# Patient Record
Sex: MALE | Race: WHITE | NOT HISPANIC OR LATINO
[De-identification: names, ages, dates, MRNs, and addresses within clinical notes are randomized per-mention and may not be internally consistent; named-entity substitution may affect disease eponyms.]

---

## 2021-03-18 ENCOUNTER — TRANSCRIPTION ENCOUNTER (OUTPATIENT)
Age: 56
End: 2021-03-18

## 2021-04-17 ENCOUNTER — EMERGENCY (EMERGENCY)
Facility: HOSPITAL | Age: 56
LOS: 1 days | Discharge: HOME | End: 2021-04-17
Attending: EMERGENCY MEDICINE
Payer: COMMERCIAL

## 2021-04-17 VITALS
OXYGEN SATURATION: 100 % | TEMPERATURE: 98 F | HEART RATE: 104 BPM | SYSTOLIC BLOOD PRESSURE: 123 MMHG | DIASTOLIC BLOOD PRESSURE: 75 MMHG | RESPIRATION RATE: 18 BRPM

## 2021-04-17 LAB
ALBUMIN SERPL ELPH-MCNC: 4.2 G/DL — SIGNIFICANT CHANGE UP (ref 3.3–5.2)
ALP SERPL-CCNC: 79 U/L — SIGNIFICANT CHANGE UP (ref 40–120)
ALT FLD-CCNC: 18 U/L — SIGNIFICANT CHANGE UP
ANION GAP SERPL CALC-SCNC: 13 MMOL/L — SIGNIFICANT CHANGE UP (ref 5–17)
AST SERPL-CCNC: 23 U/L — SIGNIFICANT CHANGE UP
BASOPHILS # BLD AUTO: 0.02 K/UL — SIGNIFICANT CHANGE UP (ref 0–0.2)
BASOPHILS NFR BLD AUTO: 0.2 % — SIGNIFICANT CHANGE UP (ref 0–2)
BILIRUB SERPL-MCNC: 0.4 MG/DL — SIGNIFICANT CHANGE UP (ref 0.4–2)
BUN SERPL-MCNC: 19 MG/DL — SIGNIFICANT CHANGE UP (ref 8–20)
CALCIUM SERPL-MCNC: 8.9 MG/DL — SIGNIFICANT CHANGE UP (ref 8.6–10.2)
CHLORIDE SERPL-SCNC: 103 MMOL/L — SIGNIFICANT CHANGE UP (ref 98–107)
CO2 SERPL-SCNC: 24 MMOL/L — SIGNIFICANT CHANGE UP (ref 22–29)
CREAT SERPL-MCNC: 0.85 MG/DL — SIGNIFICANT CHANGE UP (ref 0.5–1.3)
EOSINOPHIL # BLD AUTO: 0.1 K/UL — SIGNIFICANT CHANGE UP (ref 0–0.5)
EOSINOPHIL NFR BLD AUTO: 1.2 % — SIGNIFICANT CHANGE UP (ref 0–6)
ETHANOL SERPL-MCNC: <10 MG/DL — SIGNIFICANT CHANGE UP (ref 0–9)
GLUCOSE SERPL-MCNC: 92 MG/DL — SIGNIFICANT CHANGE UP (ref 70–99)
HCT VFR BLD CALC: 47.4 % — SIGNIFICANT CHANGE UP (ref 39–50)
HGB BLD-MCNC: 16 G/DL — SIGNIFICANT CHANGE UP (ref 13–17)
IMM GRANULOCYTES NFR BLD AUTO: 0.2 % — SIGNIFICANT CHANGE UP (ref 0–1.5)
LYMPHOCYTES # BLD AUTO: 1.58 K/UL — SIGNIFICANT CHANGE UP (ref 1–3.3)
LYMPHOCYTES # BLD AUTO: 18.4 % — SIGNIFICANT CHANGE UP (ref 13–44)
MAGNESIUM SERPL-MCNC: 2.2 MG/DL — SIGNIFICANT CHANGE UP (ref 1.6–2.6)
MCHC RBC-ENTMCNC: 33.3 PG — SIGNIFICANT CHANGE UP (ref 27–34)
MCHC RBC-ENTMCNC: 33.8 GM/DL — SIGNIFICANT CHANGE UP (ref 32–36)
MCV RBC AUTO: 98.5 FL — SIGNIFICANT CHANGE UP (ref 80–100)
MONOCYTES # BLD AUTO: 0.68 K/UL — SIGNIFICANT CHANGE UP (ref 0–0.9)
MONOCYTES NFR BLD AUTO: 7.9 % — SIGNIFICANT CHANGE UP (ref 2–14)
NEUTROPHILS # BLD AUTO: 6.2 K/UL — SIGNIFICANT CHANGE UP (ref 1.8–7.4)
NEUTROPHILS NFR BLD AUTO: 72.1 % — SIGNIFICANT CHANGE UP (ref 43–77)
PLATELET # BLD AUTO: 192 K/UL — SIGNIFICANT CHANGE UP (ref 150–400)
POTASSIUM SERPL-MCNC: 4.1 MMOL/L — SIGNIFICANT CHANGE UP (ref 3.5–5.3)
POTASSIUM SERPL-SCNC: 4.1 MMOL/L — SIGNIFICANT CHANGE UP (ref 3.5–5.3)
PROT SERPL-MCNC: 6.6 G/DL — SIGNIFICANT CHANGE UP (ref 6.6–8.7)
RBC # BLD: 4.81 M/UL — SIGNIFICANT CHANGE UP (ref 4.2–5.8)
RBC # FLD: 11.9 % — SIGNIFICANT CHANGE UP (ref 10.3–14.5)
SODIUM SERPL-SCNC: 140 MMOL/L — SIGNIFICANT CHANGE UP (ref 135–145)
WBC # BLD: 8.6 K/UL — SIGNIFICANT CHANGE UP (ref 3.8–10.5)
WBC # FLD AUTO: 8.6 K/UL — SIGNIFICANT CHANGE UP (ref 3.8–10.5)

## 2021-04-17 PROCEDURE — 99285 EMERGENCY DEPT VISIT HI MDM: CPT

## 2021-04-17 PROCEDURE — 70450 CT HEAD/BRAIN W/O DYE: CPT | Mod: 26,MA

## 2021-04-17 PROCEDURE — 93010 ELECTROCARDIOGRAM REPORT: CPT

## 2021-04-17 RX ADMIN — Medication 2 MILLIGRAM(S): at 21:00

## 2021-04-17 NOTE — ED PROVIDER NOTE - CARE PROVIDERS DIRECT ADDRESSES
[email or web address]

## 2021-04-17 NOTE — ED PROVIDER NOTE - ENMT, MLM
Airway patent, Nasal mucosa clear. Mouth with normal mucosa. Throat has no vesicles, no oropharyngeal exudates and uvula is midline. +abrasion to the R temple

## 2021-04-17 NOTE — ED PROVIDER NOTE - OBJECTIVE STATEMENT
55 year old male with no PMh presents with seizure. As per EMS, the pt fell in the bar today and had a witnessed tonic-clonic seizure lasting 1 minute. pt was awake, alert but post-ictal when EMS arrived, mental status improving throughout transport. Then, while in triage, the pt had a brief episode of jaw clenching and stopped responding to questions, abated within seconds. At the time of my evaluation, pt awake, alert, lucid and giving Hx. States several months ago had another episode of seizure, was told to f/u with neuro, but never did. he denies complaints at this time including no chest pain, SOB, abd pain, blurry vision, numbness, tignling, weakness.

## 2021-04-17 NOTE — ED PROVIDER NOTE - PROGRESS NOTE DETAILS
Gila: pt signed out to me pending reassessment. oriented x3, apporpriate, neuro intact, observed without other event. niece bedside and states pt at baseline. given neuro and epilepsy f/u, keppra script, seizure instructions including driving/heavy machinery, return precautions. stable for d/c.

## 2021-04-17 NOTE — ED ADULT NURSE NOTE - OBJECTIVE STATEMENT
Patient is alert and oriented with periods of confusion at this time. breathing even and unlabored. patient had possible seizure like activity prior ti this RN assuming care, ativan given by previous RN.  last seizure 4 months ago, patient is not on seizure medicines. no focal deficits noted. patient on cardiac monitor. denies dizziness or blurred vision. denies etoh or drugs. color normal for ethnicity.

## 2021-04-17 NOTE — ED PROVIDER NOTE - CARE PROVIDER_API CALL
Julianne Strong)  EEGEpilepsy; Neurology  [address] [place] [address]  Phone: [phone]  Fax: [phone]  Follow Up Time:     Enio Montanez; PhD)  Neurology; Vascular Neurology  [address] [facility] [address]  [place] [address]  Phone: [phone]  Fax: [phone]  Follow Up Time:

## 2021-04-17 NOTE — ED ADULT TRIAGE NOTE - CHIEF COMPLAINT QUOTE
pt BIBA from bar, per EMS pt had 1 minute tonic clonic seizure. hx 1 seizure 4 months ago, does not take seizure medication. pt confused and post ictal upon arrival with brief period of unresponsiveness in triage. small abrasion to R temple. MD waite at bedside, 2mg ativan given and priority CT called.

## 2021-04-17 NOTE — ED ADULT NURSE NOTE - CAS ELECT INFOMATION PROVIDED
patient ambulated to baseline, alert and oriented x3, discharged with neice at this time. family and patient agreeable for D/C./DC instructions

## 2021-04-17 NOTE — ED PROVIDER NOTE - CLINICAL SUMMARY MEDICAL DECISION MAKING FREE TEXT BOX
pt with second seizure episode in several months, imaging and labs wnl, back to baseline, will dc with anti-epileptic and neuro f/u

## 2021-04-17 NOTE — ED PROVIDER NOTE - PATIENT PORTAL LINK FT
You can access the FollowMyHealth Patient Portal offered by [facility] by registering at the following website: [email or web address]. By joining [employer]’s FollowMyHealth portal, you will also be able to view your health information using other applications (apps) compatible with our system.

## 2021-04-18 VITALS
DIASTOLIC BLOOD PRESSURE: 70 MMHG | OXYGEN SATURATION: 98 % | SYSTOLIC BLOOD PRESSURE: 116 MMHG | TEMPERATURE: 98 F | HEART RATE: 94 BPM | RESPIRATION RATE: 18 BRPM

## 2021-04-18 PROCEDURE — 80307 DRUG TEST PRSMV CHEM ANLYZR: CPT

## 2021-04-18 PROCEDURE — 70450 CT HEAD/BRAIN W/O DYE: CPT

## 2021-04-18 PROCEDURE — 99284 EMERGENCY DEPT VISIT MOD MDM: CPT | Mod: 25

## 2021-04-18 PROCEDURE — 36415 COLL VENOUS BLD VENIPUNCTURE: CPT

## 2021-04-18 PROCEDURE — 85025 COMPLETE CBC W/AUTO DIFF WBC: CPT

## 2021-04-18 PROCEDURE — 93005 ELECTROCARDIOGRAM TRACING: CPT

## 2021-04-18 PROCEDURE — 80053 COMPREHEN METABOLIC PANEL: CPT

## 2021-04-18 PROCEDURE — 83735 ASSAY OF MAGNESIUM: CPT

## 2021-04-18 PROCEDURE — 96374 THER/PROPH/DIAG INJ IV PUSH: CPT

## 2021-04-18 PROCEDURE — 96375 TX/PRO/DX INJ NEW DRUG ADDON: CPT

## 2021-04-18 RX ORDER — LEVETIRACETAM 250 MG/1
1 TABLET, FILM COATED ORAL
Qty: 60 | Refills: 0
Start: 2021-04-18 | End: 2021-05-17

## 2021-04-18 RX ORDER — LEVETIRACETAM 250 MG/1
1000 TABLET, FILM COATED ORAL ONCE
Refills: 0 | Status: COMPLETED | OUTPATIENT
Start: 2021-04-18 | End: 2021-04-18

## 2021-04-18 RX ADMIN — LEVETIRACETAM 400 MILLIGRAM(S): 250 TABLET, FILM COATED ORAL at 00:28

## 2021-09-27 NOTE — ED ADULT NURSE NOTE - CHIEF COMPLAINT
9/27/2021      Bessy Mcclellan  [address] [place] [address] [address] [place] [address]    Dear Bessy:    In reviewing your medical records, we do not have a copy of your Advanced Directive on file.  An Advanced Directive serves as a guide for your family and physician if a situation were to arise when you could not speak for yourself.    If you need a copy of a new blank Advanced Directive, please call or visit the clinic so we can get you a copy.  An Advanced Directive is an important document to have.  I encourage you to review and discuss your wishes with your family.  The best time to complete your Advanced Directive is while your health is good and you can think clearly about your choices.     When completing this document, please make sure that you and 2 witnesses sign it.  Witnesses cannot be related to you by blood, marriage, or adoption.  They cannot be your health care agent or cannot be a health care worker.  Witnesses can be your friends, neighbors, [employer] members, bank tellers, etc.    If you have any questions or need assistance completing your Advanced Directive, please call our clinic at Dept: [phone] . When you have completed your Advanced Directive, please bring it with you to your next appointment or drop it off at our office.    If you already have an advanced directives, please send us a copy so we can update your medical record with the information.    Thank you in advance for providing your family and me this very important information.    Sincerely,     Dutch Tam,   [address] [place] [address]  [phone]  Dept: [phone]       The patient is a 55y Male complaining of seizures.

## 2021-11-18 PROBLEM — Z78.9 OTHER SPECIFIED HEALTH STATUS: Chronic | Status: ACTIVE | Noted: 2021-04-17

## 2021-11-19 ENCOUNTER — NON-APPOINTMENT (OUTPATIENT)
Age: 56
End: 2021-11-19

## 2021-12-13 ENCOUNTER — APPOINTMENT (OUTPATIENT)
Dept: NEUROLOGY | Facility: CLINIC | Age: 56
End: 2021-12-13
Payer: COMMERCIAL

## 2021-12-13 DIAGNOSIS — G40.219 LOCALIZATION-RELATED (FOCAL) (PARTIAL) SYMPTOMATIC EPILEPSY AND EPILEPTIC SYNDROMES WITH COMPLEX PARTIAL SEIZURES, INTRACTABLE, W/OUT STATUS EPILEPTICUS: ICD-10-CM

## 2021-12-13 PROBLEM — Z00.00 ENCOUNTER FOR PREVENTIVE HEALTH EXAMINATION: Status: ACTIVE | Noted: 2021-12-13

## 2021-12-13 PROCEDURE — 99203 OFFICE O/P NEW LOW 30 MIN: CPT

## 2021-12-13 NOTE — HISTORY OF PRESENT ILLNESS
[FreeTextEntry1] : Rx:  on LEV -1500mg, sertaline 25mg bid, ASA 81mg/d\par \par \par 56 RH M 4 szs in last 4mo:\par \par Since 2.11.2021 unwitnessed - LOC/AMS, unresponsive, woke up in ambulance -> [place]\par p cardiac eval, neuro eval.  no aura/warning.\par \par 4/17/2021  seizure at bar, saw Dr Shirley. frozen, unrseponsive, LOC, witnessed. no convulsion.  <5min.  social drinker, 1-2/week with friends only. \par \par 8/11/21 argument with colleague, lost job, reports anger on LEV.  started sertaline.\par \par 10/10/21 sz from sleep.  incontinent, no tongue bite\par \par 11/5/21 seizure picking bebeto from sleep, staring, unresponsive, EMS called. \par \par In retrospect since 2020 concern for episodes of being unable to talk, no fall, convulsion.\par \par 2010 heart valve replacement\par \par Labs nl, low vit D\par AEEG, REEG x 2 nl\par MRI neg / MRA \par Sleep study ALDO\par \par No TBI, infection.  \par \par PMHX:  \par 1/8 Bro:  dc'd 2021 COVID19.  possible LOC unclear etiology.\par Bebeto - Muscular Dystrophy, stopped driving now.\par Bebeto 19 healthy\par Fa stroke in 70's, Mo AD dc 2010\par No depression\par \par SocHx: TOB 40pky,  Social ETOH.  occ MJ\par

## 2021-12-13 NOTE — DISCUSSION/SUMMARY
[FreeTextEntry1] : Concern for focal impaired awareness seizures, epilepsy\par Some mood issues\par On LEV, now sertaline as well\par Employment issues\par Unable to drive.\par Unclear etiology, discussed.\par \par GI complaints.  Rec GI eval.\par routine PMD/onc screening\par LTG, OXC, VPA options. may be better for mood\par \par x time 41min\par RTC 4mo\par \par \par  [Risks Associated with Driving/[place] Law] : As per my usual protocol, the patient was advised in regards to risks and driving privileges associated with the [place] Guidelines.  [Safety Recommendations] : The patient was advised in regards to the risk of seizures and general seizure safety recommendations including not to be bathing alone, climbing to high places and operating heavy machinery. [Compliance with Medications] : The importance of compliance with medications was reinforced. [Sleep Hygiene/Sleep Disruption Risks] : Sleep hygiene and the risks of sleep disruption were discussed. [Obtain Copies of Medical Records] : Patient was asked to obtain copies of pertinent prior medical records or studies

## 2021-12-15 PROBLEM — G40.219 COMPLEX PARTIAL EPILEPSY, INTRACTABLE: Status: ACTIVE | Noted: 2021-12-15

## 2022-04-29 ENCOUNTER — APPOINTMENT (OUTPATIENT)
Dept: NEUROLOGY | Facility: CLINIC | Age: 57
End: 2022-04-29
